# Patient Record
Sex: FEMALE | Race: WHITE | NOT HISPANIC OR LATINO | Employment: FULL TIME | URBAN - METROPOLITAN AREA
[De-identification: names, ages, dates, MRNs, and addresses within clinical notes are randomized per-mention and may not be internally consistent; named-entity substitution may affect disease eponyms.]

---

## 2021-05-24 ENCOUNTER — RECORDS - HEALTHEAST (OUTPATIENT)
Dept: ADMINISTRATIVE | Facility: CLINIC | Age: 51
End: 2021-05-24

## 2023-10-27 ENCOUNTER — HOSPITAL ENCOUNTER (EMERGENCY)
Facility: HOSPITAL | Age: 53
Discharge: HOME | End: 2023-10-27
Attending: EMERGENCY MEDICINE | Admitting: EMERGENCY MEDICINE
Payer: COMMERCIAL

## 2023-10-27 VITALS
HEIGHT: 62 IN | SYSTOLIC BLOOD PRESSURE: 142 MMHG | OXYGEN SATURATION: 94 % | WEIGHT: 160 LBS | HEART RATE: 74 BPM | RESPIRATION RATE: 14 BRPM | BODY MASS INDEX: 29.44 KG/M2 | DIASTOLIC BLOOD PRESSURE: 94 MMHG | TEMPERATURE: 97.3 F

## 2023-10-27 DIAGNOSIS — T78.3XXA ANGIOEDEMA, INITIAL ENCOUNTER: ICD-10-CM

## 2023-10-27 PROCEDURE — 99285 EMERGENCY DEPT VISIT HI MDM: CPT | Mod: 25

## 2023-10-27 PROCEDURE — 96374 THER/PROPH/DIAG INJ IV PUSH: CPT

## 2023-10-27 PROCEDURE — 96375 TX/PRO/DX INJ NEW DRUG ADDON: CPT

## 2023-10-27 PROCEDURE — 96372 THER/PROPH/DIAG INJ SC/IM: CPT | Mod: 59

## 2023-10-27 PROCEDURE — 250N000009 HC RX 250: Performed by: EMERGENCY MEDICINE

## 2023-10-27 PROCEDURE — 250N000011 HC RX IP 250 OP 636: Mod: JZ | Performed by: EMERGENCY MEDICINE

## 2023-10-27 RX ORDER — DIPHENHYDRAMINE HYDROCHLORIDE 50 MG/ML
25 INJECTION INTRAMUSCULAR; INTRAVENOUS ONCE
Status: COMPLETED | OUTPATIENT
Start: 2023-10-27 | End: 2023-10-27

## 2023-10-27 RX ORDER — METHYLPREDNISOLONE SODIUM SUCCINATE 125 MG/2ML
125 INJECTION, POWDER, LYOPHILIZED, FOR SOLUTION INTRAMUSCULAR; INTRAVENOUS ONCE
Status: COMPLETED | OUTPATIENT
Start: 2023-10-27 | End: 2023-10-27

## 2023-10-27 RX ORDER — EPINEPHRINE 0.3 MG/.3ML
0.3 INJECTION SUBCUTANEOUS PRN
Qty: 2 UNITS | Refills: 1 | Status: SHIPPED | OUTPATIENT
Start: 2023-10-27

## 2023-10-27 RX ADMIN — DIPHENHYDRAMINE HYDROCHLORIDE 25 MG: 50 INJECTION, SOLUTION INTRAMUSCULAR; INTRAVENOUS at 18:53

## 2023-10-27 RX ADMIN — METHYLPREDNISOLONE SODIUM SUCCINATE 125 MG: 125 INJECTION, POWDER, FOR SOLUTION INTRAMUSCULAR; INTRAVENOUS at 18:51

## 2023-10-27 RX ADMIN — FAMOTIDINE 20 MG: 10 INJECTION, SOLUTION INTRAVENOUS at 18:49

## 2023-10-27 RX ADMIN — EPINEPHRINE 0.3 MG: 1 INJECTION INTRAMUSCULAR; INTRAVENOUS; SUBCUTANEOUS at 18:55

## 2023-10-27 ASSESSMENT — ACTIVITIES OF DAILY LIVING (ADL)
ADLS_ACUITY_SCORE: 35
ADLS_ACUITY_SCORE: 35

## 2023-10-27 NOTE — ED TRIAGE NOTES
Patient presents here for evaluation of swelling to her tongue that began about three hours ago. At this time the swelling is reducing. She took a Zyrtec to treat. She denies eating any foods or fluids, no known environmental irritants. She reports similar episodes that also involved development of hives along with tongue swelling. The swelling is localized to the tongue. No airway compromise.      Triage Assessment (Adult)       Row Name 10/27/23 1821          Triage Assessment    Airway WDL WDL        Respiratory WDL    Respiratory WDL WDL        Skin Circulation/Temperature WDL    Skin Circulation/Temperature WDL WDL        Cardiac WDL    Cardiac WDL WDL        Peripheral/Neurovascular WDL    Peripheral Neurovascular WDL WDL        Cognitive/Neuro/Behavioral WDL    Cognitive/Neuro/Behavioral WDL WDL

## 2023-10-27 NOTE — ED PROVIDER NOTES
EMERGENCY DEPARTMENT NOTE     Name: Ann Silverio    Age/Sex: 53 year old female   MRN: [id number]   Evaluation Date & Time:  10/27/2023  6:38 PM    PCP:    No primary care provider on file.   ED Provider: Lucien Bartlett D.O.       CHIEF COMPLAINT    Oral Swelling       DIAGNOSIS & DISPOSITION/MEDICAL DECISION MAKING     1. Angioedema, initial encounter        Ann Silverio is a 53 year old female with no relevant past history who presents to the emergency department for evaluation of oral swelling.    Differential  diagnosis considered included but not limited to acute anaphylaxis , angioedema from chronic urticaria or ACE inhibitor    Medical Decision Making  Reports no ACE inhibitor or ARB use  Patient has had a history of recurrent urticaria over the past 6-week with intermittent angioedema of the tongue.  Tonight patient's tongue was more swollen than prior events and she had taken Zyrtec.  By time arrival to emergency department remained with mild angioedema mostly on the left side of the tongue without urticaria.  No respiratory distress without stridor or wheezing.  Patient received IM epinephrine, IV Solu-Medrol, diphenhydramine and Pepcid with resolution of symptoms.  She was monitored x3 hours without recurrence or progression.  Patient with history of chronic urticaria has sought dermatology referral but next opening was in January and will write for referral to follow-up next week.  Patient has no history of cancer or current symptoms including unexpected weight loss or pain and no symptoms of infection including sinus or dental but would recommend that she get follow-up with the dentist for dental x-rays.  She will continue Zyrtec daily.  She was prescribed epinephrine pen to use if recurrent angioedema.  Return criteria discussed and if the patient had recurrent angioedema will administer epinephrine pen and return to the emergency department.    Interventions: IM epinephrine, IV  "Solu-Medrol, Pepcid, diphenhydramine  Discharge Vital Signs:BP (!) 142/94   Pulse 74   Temp 97.3  F (36.3  C) (Oral)   Resp 14   Ht 1.575 m (5' 2\")   Wt 72.6 kg (160 lb)   SpO2 94%   BMI 29.26 kg/m       DISPOSITION: Home    Diagnostic studies:  Imaging:  No orders to display      Lab:  Labs Ordered and Resulted from Time of ED Arrival to Time of ED Departure - No data to display            Triage note reviewed:Patient presents here for evaluation of swelling to her tongue that began about three hours ago. At this time the swelling is reducing. She took a Zyrtec to treat. She denies eating any foods or fluids, no known environmental irritants. She reports similar episodes that also involved development of hives along with tongue swelling. The swelling is localized to the tongue. No airway compromise.      Triage Assessment (Adult)       Row Name 10/27/23 1821          Triage Assessment    Airway WDL WDL        Respiratory WDL    Respiratory WDL WDL        Skin Circulation/Temperature WDL    Skin Circulation/Temperature WDL WDL        Cardiac WDL    Cardiac WDL WDL        Peripheral/Neurovascular WDL    Peripheral Neurovascular WDL WDL        Cognitive/Neuro/Behavioral WDL    Cognitive/Neuro/Behavioral WDL WDL                         History:  Supplemental history from: Patient's   External Record(s) reviewed: Outpatient office visit August 13, 2023    Work Up:  Chart documentation includes differential considered and any EKGs or imaging independently interpreted by provider, where specified.  In additional to work up documented, I considered the following work up: NA    External consultation:  Discussion of management with another provider:NA    Complicating factors:  Care impacted by chronic illness: N/A  Care affected by social determinants of health: Access to Medical Care    Disposition considerations: Discharge. I prescribed additional prescription strength medication(s) as charted. N/A.    At the " conclusion of the encounter I discussed the results of all of the tests and the disposition. The questions were answered. The patient or family acknowledged understanding and was agreeable with the care plan.    TOTAL CRITICAL CARE TIME (EXCLUDING PROCEDURES): Not applicable    PROCEDURES:   None    EMERGENCY DEPARTMENT COURSE   6:20 PM I met with the patient to gather history and to perform my initial exam.  We discussed treatment options and the plan for care while in the Emergency Department.  9:21 PM Reevaluated and updated the patient with findings. We discussed the plan for discharge and the patient is agreeable. Reviewed supportive cares, symptomatic treatment, outpatient follow up, and reasons to return to the Emergency Department. Patient to be discharged by ED RN.     ED INTERVENTIONS     Medications   methylPREDNISolone sodium succinate (solu-MEDROL) injection 125 mg (125 mg Intravenous $Given 10/27/23 1851)   diphenhydrAMINE (BENADRYL) injection 25 mg (25 mg Intravenous $Given 10/27/23 1853)   famotidine (PEPCID) injection 20 mg (20 mg Intravenous $Given 10/27/23 1849)   EPINEPHrine (ADRENALIN) kit 0.3 mg (0.3 mg Intramuscular $Given 10/27/23 1855)       DISCHARGE MEDICATIONS        Review of your medicines        START taking        Dose / Directions   EPINEPHrine 0.3 MG/0.3ML injection 2-pack  Commonly known as: ANY BX GENERIC EQUIV      Dose: 0.3 mg  Inject 0.3 mLs (0.3 mg) into the muscle as needed for anaphylaxis (Swelling of the lips or tongue) May repeat one time in 5-15 minutes if response to initial dose is inadequate.  Quantity: 2 Units  Refills: 1               Where to get your medicines        Some of these will need a paper prescription and others can be bought over the counter. Ask your nurse if you have questions.    Bring a paper prescription for each of these medications  EPINEPHrine 0.3 MG/0.3ML injection 2-pack           INFORMATION SOURCE AND LIMITATIONS    History/Exam limitations:  None  Patient information was obtained from: patient  Use of : N/A    HISTORY OF PRESENT ILLNESS   Ann Silverio is a 53 year old year old female with no relevant past history, who presents to this ED via walk with  for evaluation of oral swelling.    Patient reports sudden onset left sided tongue swelling about 3 hours PTA (~3 PM). She tried taking Zyrtec and ultimately decided to come into the ED for evaluation. Patient endorses a history of tongue swelling with associating hives before with no known etiology. Since onset, her swelling has improved. She otherwise feels fine prior to this episode. She denies any new foods or new environmental factors. She doesn't take any HTN medications. She denies recent dentist visit. There were no other concerns/complaints at this time.    REVIEW OF SYSTEMS:   All other systems reviewed and are negative except as noted above in HPI.    PATIENT HISTORY   History reviewed. No pertinent past medical history.  There is no problem list on file for this patient.    History reviewed. No pertinent surgical history.    Allergies   Allergen Reactions    Penicillins        OUTPATIENT MEDICATIONS     Discharge Medication List as of 10/27/2023  9:18 PM        START taking these medications    Details   EPINEPHrine (ANY BX GENERIC EQUIV) 0.3 MG/0.3ML injection 2-pack Inject 0.3 mLs (0.3 mg) into the muscle as needed for anaphylaxis (Swelling of the lips or tongue) May repeat one time in 5-15 minutes if response to initial dose is inadequate., Disp-2 Units, R-1, Local Print            Vitals:    10/27/23 1915 10/27/23 1930 10/27/23 2045 10/27/23 2115   BP: (!) 151/99 125/86 132/85 (!) 142/94   Pulse: 76 73 73 74   Resp:       Temp:       TempSrc:       SpO2: 99% 97% 95% 94%   Weight:       Height:           Physical Exam   Constitutional: Oriented to person, place, and time. Appears well-developed and well-nourished.   HEENT: Slight angioedema on the left side of  tongue.   Head: Atraumatic.   Neck: Normal range of motion. Neck supple.   Cardiovascular: Normal rate, regular rhythm and normal heart sounds.    Pulmonary/Chest: Normal effort  and breath sounds normal.   Abdominal: Soft. Bowel sounds are normal.   Musculoskeletal: Normal range of motion.   Neurological: Alert and oriented to person, place, and time. Normal strength. No sensory deficit. No cranial nerve deficit.  Skin: Skin is warm and dry.   Psychiatric: Normal mood and affect. Behavior is normal. Thought content normal.     DIAGNOSTICS    LABORATORY FINDINGS (REVIEWED AND INTERPRETED):  Labs Ordered and Resulted from Time of ED Arrival to Time of ED Departure - No data to display      IMAGING (REVIEWED AND INTERPRETED):  No orders to display         ECG (REVIEWED AND INTERPRETED):   ECG: None        INatalie, am serving as a scribe to document services personally performed by Lucien Bartlett D.O., based on my observation and the provider s statements to me.    Lucien ADAMS D.O., attest that Natalie Parada is acting in a scribe capacity, has observed my performance of the services and has documented them in accordance with my direction.    Lucien Bartlett D.O.  EMERGENCY MEDICINE   10/27/23  [facility] EMERGENCY DEPARTMENT  [address] [place] [id number] [phone]  Dept: [phone]     Lucien Bartlett DO  10/28/23 0234

## 2023-10-28 NOTE — DISCHARGE INSTRUCTIONS
Continue Zyrtec daily.  You have been given a referral to dermatologist for follow-up and should receive a call Monday for an appointment next week.  Also see your primary care physician in follow-up and consider seeing a dentist for set of dental x-rays.  If you have recurrent tongue swelling particularly if there is any difficulty breathing administer the epinephrine pen and return immediately to the emergency department.

## 2023-11-16 ENCOUNTER — HOSPITAL ENCOUNTER (EMERGENCY)
Facility: HOSPITAL | Age: 53
Discharge: HOME | End: 2023-11-16
Attending: EMERGENCY MEDICINE | Admitting: EMERGENCY MEDICINE
Payer: COMMERCIAL

## 2023-11-16 VITALS
BODY MASS INDEX: 29.44 KG/M2 | DIASTOLIC BLOOD PRESSURE: 76 MMHG | SYSTOLIC BLOOD PRESSURE: 128 MMHG | WEIGHT: 160 LBS | HEIGHT: 62 IN | HEART RATE: 84 BPM | TEMPERATURE: 97.2 F | OXYGEN SATURATION: 95 % | RESPIRATION RATE: 16 BRPM

## 2023-11-16 DIAGNOSIS — T78.40XA ALLERGIC REACTION, INITIAL ENCOUNTER: ICD-10-CM

## 2023-11-16 PROCEDURE — 96375 TX/PRO/DX INJ NEW DRUG ADDON: CPT

## 2023-11-16 PROCEDURE — 99285 EMERGENCY DEPT VISIT HI MDM: CPT | Mod: 25

## 2023-11-16 PROCEDURE — 250N000009 HC RX 250: Performed by: EMERGENCY MEDICINE

## 2023-11-16 PROCEDURE — 96372 THER/PROPH/DIAG INJ SC/IM: CPT

## 2023-11-16 PROCEDURE — 96374 THER/PROPH/DIAG INJ IV PUSH: CPT

## 2023-11-16 PROCEDURE — 250N000011 HC RX IP 250 OP 636: Mod: JZ | Performed by: EMERGENCY MEDICINE

## 2023-11-16 RX ORDER — PREDNISONE 20 MG/1
TABLET ORAL
Qty: 8 TABLET | Refills: 0 | Status: SHIPPED | OUTPATIENT
Start: 2023-11-17

## 2023-11-16 RX ORDER — METHYLPREDNISOLONE SODIUM SUCCINATE 125 MG/2ML
125 INJECTION, POWDER, LYOPHILIZED, FOR SOLUTION INTRAMUSCULAR; INTRAVENOUS ONCE
Status: COMPLETED | OUTPATIENT
Start: 2023-11-16 | End: 2023-11-16

## 2023-11-16 RX ADMIN — METHYLPREDNISOLONE SODIUM SUCCINATE 125 MG: 125 INJECTION, POWDER, FOR SOLUTION INTRAMUSCULAR; INTRAVENOUS at 18:49

## 2023-11-16 RX ADMIN — FAMOTIDINE 20 MG: 10 INJECTION, SOLUTION INTRAVENOUS at 18:56

## 2023-11-16 RX ADMIN — EPINEPHRINE 0.3 MG: 1 INJECTION INTRAMUSCULAR; INTRAVENOUS; SUBCUTANEOUS at 18:57

## 2023-11-16 ASSESSMENT — ACTIVITIES OF DAILY LIVING (ADL)
ADLS_ACUITY_SCORE: 35
ADLS_ACUITY_SCORE: 35

## 2023-11-17 ASSESSMENT — ENCOUNTER SYMPTOMS
NAUSEA: 0
VOMITING: 0
FEVER: 0
SHORTNESS OF BREATH: 0
LIGHT-HEADEDNESS: 0
DIARRHEA: 0
CHEST TIGHTNESS: 0

## 2023-11-17 NOTE — ED TRIAGE NOTES
The pt presents with an swollen lip, erythema, and tenderness. Pt went to urgent care and they sent her to the ER to be evaluated. Dr. Ibarra present at bedside. Pt took 50 of benadryl prior to arrival to the ED.      Triage Assessment (Adult)       Row Name 11/16/23 6978          Triage Assessment    Airway WDL WDL        Respiratory WDL    Respiratory WDL WDL        Skin Circulation/Temperature WDL    Skin Circulation/Temperature WDL X        Cardiac WDL    Cardiac WDL WDL        Peripheral/Neurovascular WDL    Peripheral Neurovascular WDL WDL        Cognitive/Neuro/Behavioral WDL    Cognitive/Neuro/Behavioral WDL WDL

## 2023-11-17 NOTE — ED PROVIDER NOTES
EMERGENCY DEPARTMENT ENCOUNTER      NAME: Ann Silverio  AGE: 53 year old female  YOB: 1970  MRN: [id number]  EVALUATION DATE & TIME: 11/16/2023  6:20 PM    PCP: Aaliyah [facility] [place] [place]    ED PROVIDER: Zoraida Ibarra DO      Chief Complaint   Patient presents with    Allergic Reaction         FINAL IMPRESSION:  1. Allergic reaction, initial encounter          ED COURSE & MEDICAL DECISION MAKING:    Pertinent Labs & Imaging studies reviewed. (See chart for details)  6:24 PM I met the patient and performed my initial interview and exam.  6:40 PM Patient reassessed.  No change in symptoms  6:55 PM Patient reassessed.  No change in symptoms  7:35 PM Patient reassessed.  Symptoms improving  8:20 PM Patient reassessed.  Symptoms improving    53 year old female presents to the Emergency Department for evaluation of swelling, urticaria.  Patient notes intermittent symptoms over the past several weeks.  She was seen in this department back in October with tongue swelling.  She is not on an ACE inhibitor.  No family history.  She reports 1 episode 20 years ago but now has had intermittent symptoms of the past several weeks.  Unknown any new exposures.  She has a large patch of urticaria to her right flank, her chin and has significant lower lip swelling.  This does not extend posteriorly into her oropharynx.  She was given epinephrine, Solu-Medrol, Pepcid.  She took Benadryl prior to arrival.  She was observed for a period of almost 4 hours with improving symptoms.  I feel she is safe for discharge.  We will continue prednisone for few days.  She has an EpiPen at home.  Sounds like she has an allergy appointment but not until January, I put in a more emergent referral for her given her signs of angioedema.  We discussed return precautions.    At the conclusion of the encounter I discussed the results of all of the tests and the disposition. The questions were answered. The patient or family  acknowledged understanding and was agreeable with the care plan.     Medical Decision Making    History:  Supplemental history from: Documented in chart, if applicable  External Record(s) reviewed: Documented in chart, if applicable.    Work Up:  Chart documentation includes differential considered and any EKGs or imaging independently interpreted by provider, where specified.  In additional to work up documented, I considered the following work up: Documented in chart, if applicable.    External consultation:  Discussion of management with another provider: Documented in chart, if applicable    Complicating factors:  Care impacted by chronic illness: N/A  Care affected by social determinants of health: N/A    Disposition considerations: Discharge. I prescribed additional prescription strength medication(s) as charted. I considered admission, but discharged patient after significant clinical improvement.      Critical Care     Performed by: Dr Kathleen Ibarra  Authorized by: Dr Kathleen Ibarra  Total critical care time: 35 minutes  Critical care was necessary to treat or prevent imminent or life-threatening deterioration of the following conditions: Anaphylaxis  Critical care was time spent personally by me on the following activities: development of treatment plan with patient or surrogate, discussions with consultants, examination of patient, evaluation of patient's response to treatment, obtaining history from patient or surrogate, ordering and performing treatments and interventions, ordering and review of laboratory studies, ordering and review of radiographic studies, re-evaluation of patient's condition and monitoring for potential decompensation.  Critical care time was exclusive of separately billable procedures and treating other patients.     MEDICATIONS GIVEN IN THE EMERGENCY:  Medications   famotidine (PEPCID) injection 20 mg (20 mg Intravenous $Given 11/16/23 1113)   methylPREDNISolone sodium succinate  (solu-MEDROL) injection 125 mg (125 mg Intravenous $Given 11/16/23 0164)   EPINEPHrine (ADRENALIN) kit 0.3 mg (0.3 mg Intramuscular $Given 11/16/23 1470)       NEW PRESCRIPTIONS STARTED AT TODAY'S ER VISIT  Discharge Medication List as of 11/16/2023 10:12 PM        START taking these medications    Details   predniSONE (DELTASONE) 20 MG tablet Take two tablets (= 40mg) each day for 4 (four) days, Disp-8 tablet, R-0, E-Prescribe                =================================================================    HPI    Patient information was obtained from: Patient and      Use of : N/A         Ann Silverio is a 53 year old female with no pertinent past medical history who presents to this ED for evaluation of lip swelling.  She reports just prior to arrival she noted a hive to her right flank.  She also noted some fullness to her chin.  She took Benadryl at home.  She went to urgent care where she started to have swelling of her lower lip.  Patient reports a history of lip swelling 20 years ago which then went away.  Then over a 6-week.  Intermittent urticaria and swelling of her tongue.  She was seen in this ED on 10/27/2023.  This chart was reviewed.  She received IM epinephrine, Solu-Medrol, diphenhydramine and Pepcid with resolution of symptoms.  She has a follow-up scheduled with allergy but not until January.  She reports she feels that stress is a trigger but otherwise does not know any trigger.  She is not on an ACE inhibitor or ARB.  No family history of similar.  No recent illnesses, fevers, shortness of breath, difficulty swallowing, nausea and vomiting.      REVIEW OF SYSTEMS   Review of Systems   Constitutional:  Negative for fever.   Respiratory:  Negative for chest tightness and shortness of breath.    Cardiovascular:  Negative for chest pain.   Gastrointestinal:  Negative for diarrhea, nausea and vomiting.   Neurological:  Negative for light-headedness.        PAST MEDICAL  "HISTORY:  No past medical history on file.    PAST SURGICAL HISTORY:  No past surgical history on file.        CURRENT MEDICATIONS:    predniSONE (DELTASONE) 20 MG tablet  EPINEPHrine (ANY BX GENERIC EQUIV) 0.3 MG/0.3ML injection 2-pack         ALLERGIES:  Allergies   Allergen Reactions    Penicillins        FAMILY HISTORY:  No family history on file.    SOCIAL HISTORY:   Social History     Socioeconomic History    Marital status: Single       VITALS:  /76   Pulse 84   Temp 97.2  F (36.2  C) (Temporal)   Resp 16   Ht 1.575 m (5' 2\")   Wt 72.6 kg (160 lb)   SpO2 95%   BMI 29.26 kg/m      PHYSICAL EXAM    Physical Exam  Constitutional:       General: She is not in acute distress.  HENT:      Head: Normocephalic and atraumatic.      Mouth/Throat:      Mouth: Angioedema (swelling to lower lip and chin.  Does not extend posteriorly) present.      Pharynx: Oropharynx is clear.   Eyes:      Pupils: Pupils are equal, round, and reactive to light.   Cardiovascular:      Rate and Rhythm: Normal rate and regular rhythm.      Pulses: Normal pulses.      Heart sounds: Normal heart sounds.   Pulmonary:      Effort: Pulmonary effort is normal.      Breath sounds: Normal breath sounds.   Abdominal:      General: Abdomen is flat. Bowel sounds are normal.      Palpations: Abdomen is soft.      Tenderness: There is no abdominal tenderness.   Musculoskeletal:         General: Normal range of motion.   Skin:     General: Skin is warm and dry.      Capillary Refill: Capillary refill takes less than 2 seconds.      Findings: Rash present. Rash is urticarial (Right flank and chin).   Neurological:      General: No focal deficit present.      Mental Status: She is alert and oriented to person, place, and time.               Zoraida Ibarra DO  Emergency Medicine  [facility] EMERGENCY DEPARTMENT  [facility] [facility] [id number] [phone]  Dept: [phone]       Stacey, " Zoraida LOPEZ DO  11/17/23 0029

## 2023-11-17 NOTE — DISCHARGE INSTRUCTIONS
I would use Zyrtec daily  Continue prednisone for 4 more days  EpiPen as needed for worsening swelling, return immediately to the emergency department  I put in a new emergent follow-up with allergy  Please be aware that coverage of these services is subject to the terms and limitations of your health insurance plan.  Call member services at your health plan with any benefit or coverage questions.   [facility] will call you to coordinate your care as prescribed by the provider.  If you don t hear from a representative within 2 business days, please call [phone]